# Patient Record
Sex: FEMALE | URBAN - METROPOLITAN AREA
[De-identification: names, ages, dates, MRNs, and addresses within clinical notes are randomized per-mention and may not be internally consistent; named-entity substitution may affect disease eponyms.]

---

## 2018-12-02 ENCOUNTER — VIRTUAL VISIT (OUTPATIENT)
Dept: FAMILY MEDICINE | Facility: OTHER | Age: 53
End: 2018-12-02

## 2018-12-03 NOTE — PROGRESS NOTES
"Date:   Clinician: Marcello Castillo  Clinician NPI: [id number]  Patient: Zoya Foreman  Patient : 1965  Patient Address: [address] [place] [id number]  Patient Phone: [phone]  Visit Protocol: Eye conditions  Patient Summary:  Zoya is a 53 year old (: 1965 ) female who initiated a Visit for conjunctivitis.  When asked the question \"Please sign me up to receive news, health information and promotions from [employer].\", Zoya responded \"No\".   A synchronous visit is necessary because the patient reported the following abnormal symptoms:   Sensitivity to light (requires clarification)   Images of her eye condition were uploaded.   Her symptoms started today and affect the left eye. The symptoms consist of eyelid swelling, light sensitivity, drainage coming from the eye(s), and eye redness. Additionally, her vision has become more blurry since her symptoms started, but it's possible the blurry vision is caused by the eyelid swelling and/or drainage coming from the eye(s).   Symptom details   Drainage: The color of the drainage coming out of her eye(s) is yellow. The drainage is thick but does not cause her eyelids to be stuck shut in the morning.   Denied symptoms include itchy eye(s), eye pain, and bumps on the eyelid. Zoya does not have subconjunctival hemorrhage. She does not feel feverish.   In the past 2 weeks, she has had a cold or an ear infection.   Zoya has not had a recent diagnosis of conjunctivitis. She also has not had a recent foreign body in the eye(s), eye injury, and eye surgery. It is not known if Zoya has recently been exposed to someone with a red eye or an eye infection. She does not wear contact lenses. Zoya has not ever been diagnosed with glaucoma and denies risk factors for glaucoma (not of  descent and no family history of glaucoma ).   Zoya is not taking medication to treat her current symptoms.   Zoya requires proof of evaluation of " her eye condition before returning to school, work, or .   Zoya smokes or uses smokeless tobacco.   Additional information as reported by the patient (free text): Left earache,fluid behind eardrum, head/chest cold with green phlegm   MEDICATIONS: ropinirole oral, lisinopril oral, lansoprazole oral, ALLERGIES: Vicodin, Penicillins, Sulfa (Sulfonamide Antibiotics), Morpholine Analogues  Clinician Response:  Dear Zoya,  Based on the information provided, you most likely have viral conjunctivitis, more commonly called pink eye. There are no drops or ointments available to treat conjunctivitis caused by a virus. Specifically, antibiotics will not cure a viral infection or make it less contagious.  Unless your are allergic to the over-the-counter medication(s) below, I recommend using:  Artificial Tears as needed. Apply 1-2 drops in the affected eye(s). These eye drops help to reduce dryness and irritation of your eyes. However, this medication does not reduce the spread of viruses that can cause eye infections. This is an over-the-counter medication that does not require a prescription.   Ketotifen (such as Alaway, Zaditor, or Claritin eye) 0.025% ophthalmic solution. As needed, apply 1 drop in each eye 2 times a day. These eye drops help to reduce itching of your eyes. However, this medication does not reduce the spread of viruses that can cause eye infections. This is an over-the-counter medication that does not require a prescription.   To reduce the spread of the eye infection, you should not use eye makeup until the infection has fully resolved, and be sure to wash your hands at least once per hour and avoid touching the eyes as much as possible.  The following will reduce the risk for future eye infections:     Frequent handwashing    Replace towels and washcloths daily    Do not share towels and washcloths with others    Replace eye makeup used while eyes were infected    Do not use anyone else's eye  makeup     Glaucoma is a condition that involves increased pressure inside the eye most often seen in people who are over 50 years of age. Glaucoma usually does not have any noticeable symptoms until permanent vision loss has occurred. Fortunately, a simple test during an eye exam can detect glaucoma even before you have symptoms.  Because you are over the age of 50, I recommend you have an eye exam every 1-2 years to screen for this and other eye conditions even if you do not wear glasses.  Follow up with your provider if symptoms are not improving after a week. Be seen earlier if you develop any new or worsening symptoms.  I understand you require proof of evaluation and treatment by a healthcare provider. The statement below summarizes my evaluation and when to return to work, school, or . Please print a copy of this Visit if written verification is needed.  I have diagnosed Zoya with viral conjunctivitis. I did not prescribe antibiotics because they are not an effective treatment for viral infections and will not make it less contagious. Zoya can return to work, school, or  when discharge from the eye is not present.  Finally, as your provider, I need you to know that becoming tobacco-free is the most important thing you can do to protect your current and future health.   Diagnosis: Viral conjunctivitis  Diagnosis ICD: B30.8  Triage Notes: Spoke to patient on the phone verified her name and date of birth. She?s had a couple days of a cold. Pressure behind her left ear off. Now today her eye has been  be an irritated  and goopy She has a history of corneal issues.  I advised of this appears to be viral and I will prescribe her some wetting drops. She?s not improving in the next day or two she?s advised to see her eye doctor.  Synchronous Triage: phone, status: completed, duration: 280 seconds

## 2020-03-22 ENCOUNTER — VIRTUAL VISIT (OUTPATIENT)
Dept: FAMILY MEDICINE | Facility: OTHER | Age: 55
End: 2020-03-22

## 2020-03-23 NOTE — PROGRESS NOTES
"Date: 2020 13:53:42  Clinician: Amber Chauhan  Clinician NPI: [id number]  Patient: Zoya Foreman  Patient : 1965  Patient Address: [address] [place] [address]  Patient Phone: [phone]  Visit Protocol: URI  Patient Summary:  Zoya is a 54 year old ( : 1965 ) female who initiated a Visit for cold, sinus infection, or influenza. When asked the question \"Please sign me up to receive news, health information and promotions from [employer].\", Zoya responded \"No\".    Zoya states her symptoms started gradually 7-9 days ago. After her symptoms started, they improved and then got worse again.   Her symptoms consist of ear pain, rhinitis, myalgia, a cough, and malaise.   Symptom details     Nasal secretions: The color of her mucus is clear.    Cough: Zoya coughs every 5-10 minutes and her cough is more bothersome at night. Phlegm does not come into her throat when she coughs. She believes her cough is caused by post-nasal drip.      Zoya denies having enlarged lymph nodes, facial pain or pressure, wheezing, sore throat, nasal congestion, chills, teeth pain, headache, and fever. She also denies taking antibiotic medication for the symptoms and having recent facial or sinus surgery in the past 60 days. She is not experiencing dyspnea.   Precipitating events  She has not recently been exposed to someone with influenza. Zoya has not been in close contact with any high risk individuals.   Pertinent COVID-19 (Coronavirus) information  Zoya has not traveled internationally or to the areas where COVID-19 (Coronavirus) is widespread, including cruise ship travel in the last 14 days before the start of her symptoms.   Zoya has not had a close contact with a laboratory-confirmed COVID-19 patient within 14 days of symptom onset. She has had a close contact with a suspected COVID-19 patient within 14 days of symptom onset. Additional information about contact with COVID-19 (Coronavirus) patient as " reported by the patient (free text): Not sure   Zoya is not a healthcare worker and does not work in a healthcare facility.   Pertinent medical history  Zoya typically gets a yeast infection when she takes antibiotics. She has used fluconazole (Diflucan) to treat previous yeast infections. 2 doses of fluconazole (Diflucan) has typically been needed for symptoms to resolve in the past.  Zoya does not need a return to work/school note.   Weight: 185 lbs   Zoya smokes or uses smokeless tobacco.   Weight: 185 lbs    MEDICATIONS: sertraline oral, lisinopril-hydrochlorothiazide oral, atorvastatin oral, lisinopril oral, ropinirole oral, lansoprazole oral, ALLERGIES: Vicodin, Penicillins, Sulfa (Sulfonamide Antibiotics), Morpholine Analogues  Clinician Response:  Dear Zoya,  Based on the information provided, you have a viral upper respiratory infection, otherwise known as a cold. Symptoms vary from person to person, but can include sneezing, coughing, a runny nose, sore throat, and headache and range from mild to severe.  Unfortunately, there are no medications that can cure a cold, so treatment is focused on controlling symptoms as much as possible. Most people gradually feel better until symptoms are gone in 1-2 weeks.  Medication information  Because you have a viral infection, antibiotics will not help you get better. Treating a viral infection with antibiotics could actually make you feel worse.  Unless you are allergic to the over-the-counter medication(s) below, I recommend using:       Acetaminophen (Tylenol or store brand) oral tablet. Take 1-2 tablets by mouth every 4-6 hours to help with the discomfort.    An antihistamine such as Allegra, Claritin, Zyrtec, or store brand. Please follow the instructions on the package.    A decongestant such as Sudafed PE or store brand.     Over-the-counter medications do not require a prescription. Ask the pharmacist if you have any questions.  Self care  Steps you can  take to be as comfortable as possible:     Rest.    Drink plenty of water and other liquids.    Take a hot shower to loosen congestion    Take a spoonful of honey to reduce your cough.     Also, as your provider, I need you to know that becoming tobacco-free is the most important thing you can do to protect your current and future health.  When to seek care  Please be seen in a clinic or urgent care if new symptoms develop, or symptoms become worse.  COVID-19 (Coronavirus) General Information  With the increase in the number of COVID-19 (Coronavirus) cases, we understand you may have some questions. Below is some helpful information on COVID-19 (Coronavirus).  How can I protect myself and others from the COVID-19 (Coronavirus)?  Because there is currently no vaccine to prevent infection, the best way to protect yourself is to avoid being exposed to this virus. Put distance between yourself and other people if COVID-19 (Coronavirus) is spreading in your community. The virus is thought to spread mainly from person-to-person.     Between people who are in close contact with one another (within about 6 about) for a prolonged period (10 minutes or longer).    Through respiratory droplets produced when an infected person coughs or sneezes.     The CDC recommends the following additional steps to protect yourself and others:     Wash your hands often with soap and water for at least 20 seconds, especially after blowing your nose, coughing, or sneezing; going to the bathroom; and before eating or preparing food.  Use an alcohol-based hand  that contains at least 60 percent alcohol if soap and water are not available.        Avoid touching your eyes, nose and mouth with unwashed hands.    Avoid close contact with people who are sick.    Stay home when you are sick.    Cover your cough or sneeze with a tissue, then throw the tissue in the trash.    Clean and disinfect frequently touched objects and surfaces.     You  can help stop COVID-19 (Coronavirus) by knowing the signs and symptoms:     Fever    Cough    Shortness of breath     Contact your healthcare provider if   Develop symptoms   AND   Have been in close contact with a person known to have COVID-19 (Coronavirus) or live in or have recently traveled from an area with ongoing spread of COVID-19 (Coronavirus). Call ahead before you go to a doctor's office or emergency room. Tell them about your recent travel and your symptoms.   For the most up to date information, visit the CDC's website.  Self-monitoring  Self-monitoring means people should monitor themselves for fever by taking their temperatures twice a day and remain alert for a cough or difficulty breathing.  It is important to check your health two times each day for 14 days after a potential exposure to a person with COVID-19 (Coronavirus) or after travel from a location where COVID-19 (Coronavirus) is widespread. If you have been exposed to a person with COVID-19 (Coronavirus), it may take up to 14 days to know if you will get sick. Follow the steps below to check and record your health.     Take your temperature with a thermometer twice a day, once in the morning and once in the evening, and watch for a cough or difficulty breathing for 14 days.    Write down your temperature and any COVID-19 symptoms you may have: feeling feverish, coughing, or difficulty breathing.    Stay home from work or school.    Do not take public transportation, taxis, or ride-shares.    Avoid crowded places (such as shopping centers and movie theaters) and limit your activities in public.    Keep your distance from others (about 6 feet or 2 meters).    If you get sick with fever, cough, or trouble breathing, contact your healthcare provider and tell them about your recent travel and/or your symptoms.    If you need to seek medical care for other reasons, such as dialysis, call ahead to your doctor and tell them about your recent travel.      Steps to help prevent the spread of COVID-19 (Coronavirus) if you are sick  If you are sick with COVID-19 (Coronavirus) or suspect you are infected with the virus that causes COVID-19 (Coronavirus), follow the steps below to help prevent the disease from spreading&nbsp;to people in your home and community.     Stay home except to get medical care. Home isolation may be started in consultation with your healthcare clinician.    Separate yourself from other people and animals in your home.    Call ahead before visiting your doctor if you have a medical appointment.    Wear a facemask when you are around other people.    Cover your cough and sneezes.    Clean your hands often.    Avoid sharing personal household items.    Clean and disinfect frequently touched objects and surfaces everyday.    You will need to have someone drop off medications or household supplies (if needed) at your house without coming inside or in contact with you or others living in your house.    Monitor your symptoms and seek prompt medical care if your illness is worsening (e.g. Difficulty breathing).    Discontinue home isolation only in consultation with your healthcare provider.     For more detailed and up to date information on what to do if you are sick, visit this link: What to Do If You Are Sick With Coronavirus Disease 2019 (COVID-19).  Do I need to be tested for COVID-19 (Coronavirus)?     At this time, the limited number of available tests are controlled by the state and local health departments and are being reserved for more seriously ill patients, those with known exposure to confirmed patients, and those with recent travel (within 14 days) to countries with high rates of COVID-19 (Coronavirus).    Decisions on which patients receive testing will be based on the local spread of COVID-19 (Coronavirus) as well as the symptoms. Your healthcare provider will make the final decision on whether you should be tested.    In the  "meantime, if you have concerns that you may have been exposed, it is reasonable to practice \"social distancing.\"&nbsp; If you are ill with a cold or flu-like illness, please monitor your symptoms and reach out to your healthcare provider if your symptoms worsen.    For more up to date information, visit this link: COVID-19 (Coronavirus) Frequently Asked Questions and Answers.      Diagnosis: Viral URI  Diagnosis ICD: J06.9  "